# Patient Record
Sex: FEMALE | Race: WHITE | Employment: STUDENT | URBAN - METROPOLITAN AREA
[De-identification: names, ages, dates, MRNs, and addresses within clinical notes are randomized per-mention and may not be internally consistent; named-entity substitution may affect disease eponyms.]

---

## 2017-12-29 ENCOUNTER — HOSPITAL ENCOUNTER (OUTPATIENT)
Age: 37
Discharge: HOME | End: 2017-12-29
Attending: EMERGENCY MEDICINE
Payer: COMMERCIAL

## 2017-12-29 ENCOUNTER — APPOINTMENT (OUTPATIENT)
Dept: GENERAL RADIOLOGY | Age: 37
End: 2017-12-29
Attending: EMERGENCY MEDICINE
Payer: COMMERCIAL

## 2017-12-29 VITALS
RESPIRATION RATE: 20 BRPM | HEART RATE: 96 BPM | DIASTOLIC BLOOD PRESSURE: 89 MMHG | OXYGEN SATURATION: 97 % | TEMPERATURE: 98 F | SYSTOLIC BLOOD PRESSURE: 118 MMHG

## 2017-12-29 DIAGNOSIS — S93.402A MODERATE LEFT ANKLE SPRAIN, INITIAL ENCOUNTER: Primary | ICD-10-CM

## 2017-12-29 DIAGNOSIS — S93.602A SPRAIN OF LEFT FOOT, INITIAL ENCOUNTER: ICD-10-CM

## 2017-12-29 PROCEDURE — 73610 X-RAY EXAM OF ANKLE: CPT | Performed by: EMERGENCY MEDICINE

## 2017-12-29 PROCEDURE — 73630 X-RAY EXAM OF FOOT: CPT | Performed by: EMERGENCY MEDICINE

## 2017-12-29 PROCEDURE — 99203 OFFICE O/P NEW LOW 30 MIN: CPT

## 2017-12-29 PROCEDURE — 99204 OFFICE O/P NEW MOD 45 MIN: CPT

## 2017-12-29 NOTE — ED PROVIDER NOTES
Patient presents with:  Lower Extremity Injury (musculoskeletal)    HPI:     Migue Castelan is a 42year old female who presents with chief complaint of L ankle/foot pain.   Yesterday pt was walking down the stairs talking on the phone and thought she was she missed a step and fell down 2 wooden stairs yesterday. Patient has pain and swelling to left lateral ankle and lateral foot. FINDINGS:  The joint spaces are normal.  No definite acute fracture. Marked soft tissue swelling laterally and anteriorly.

## 2019-01-22 PROBLEM — F33.9 RECURRENT MAJOR DEPRESSIVE DISORDER (HCC): Status: ACTIVE | Noted: 2019-01-22

## 2019-03-24 ENCOUNTER — WALK IN (OUTPATIENT)
Dept: URGENT CARE | Age: 39
End: 2019-03-24

## 2019-03-24 VITALS
HEART RATE: 88 BPM | TEMPERATURE: 98.4 F | OXYGEN SATURATION: 96 % | SYSTOLIC BLOOD PRESSURE: 118 MMHG | DIASTOLIC BLOOD PRESSURE: 78 MMHG | RESPIRATION RATE: 16 BRPM

## 2019-03-24 DIAGNOSIS — N30.90 CYSTITIS: Primary | ICD-10-CM

## 2019-03-24 LAB
APPEARANCE, POC: ABNORMAL
BILIRUBIN, POC: NEGATIVE
COLOR, POC: YELLOW
GLUCOSE UR-MCNC: NEGATIVE MG/DL
KETONES, POC: NEGATIVE
NITRITE, POC: POSITIVE
OCCULT BLOOD, POC: NEGATIVE
PH UR: 6.5 [PH] (ref 5–7)
PROT UR-MCNC: ABNORMAL G/DL
SP GR UR: 1 (ref 1–1.03)
UROBILINOGEN UR-MCNC: 0.2 MG/DL (ref 0–1)
WBC (LEUKOCYTE) ESTERASE, POC: ABNORMAL

## 2019-03-24 PROCEDURE — 87086 URINE CULTURE/COLONY COUNT: CPT | Performed by: NURSE PRACTITIONER

## 2019-03-24 PROCEDURE — 87186 SC STD MICRODIL/AGAR DIL: CPT | Performed by: NURSE PRACTITIONER

## 2019-03-24 PROCEDURE — 81002 URINALYSIS NONAUTO W/O SCOPE: CPT | Performed by: NURSE PRACTITIONER

## 2019-03-24 PROCEDURE — 87088 URINE BACTERIA CULTURE: CPT | Performed by: NURSE PRACTITIONER

## 2019-03-24 PROCEDURE — 99203 OFFICE O/P NEW LOW 30 MIN: CPT | Performed by: NURSE PRACTITIONER

## 2019-03-24 RX ORDER — DESVENLAFAXINE 100 MG/1
100 TABLET, EXTENDED RELEASE ORAL DAILY
COMMUNITY

## 2019-03-24 RX ORDER — CLONIDINE HYDROCHLORIDE 0.1 MG/1
0.1 TABLET ORAL DAILY
COMMUNITY

## 2019-03-24 RX ORDER — PHENAZOPYRIDINE HYDROCHLORIDE 100 MG/1
100 TABLET, FILM COATED ORAL 3 TIMES DAILY PRN
Qty: 9 TABLET | Refills: 0 | Status: SHIPPED | OUTPATIENT
Start: 2019-03-24 | End: 2019-03-27

## 2019-03-24 RX ORDER — NITROFURANTOIN 25; 75 MG/1; MG/1
100 CAPSULE ORAL 2 TIMES DAILY
Qty: 10 CAPSULE | Refills: 0 | Status: SHIPPED | OUTPATIENT
Start: 2019-03-24 | End: 2019-03-29

## 2019-03-24 RX ORDER — METHYLPHENIDATE HYDROCHLORIDE 20 MG/1
20 TABLET ORAL 3 TIMES DAILY
COMMUNITY

## 2019-03-24 RX ORDER — BUPROPION HYDROCHLORIDE 300 MG/1
300 TABLET ORAL DAILY
COMMUNITY

## 2019-03-24 ASSESSMENT — ENCOUNTER SYMPTOMS
ABDOMINAL PAIN: 0
VOMITING: 0
FEVER: 0
SHORTNESS OF BREATH: 0
CHILLS: 0
DIARRHEA: 0

## 2019-03-26 ENCOUNTER — TELEPHONE (OUTPATIENT)
Dept: URGENT CARE | Age: 39
End: 2019-03-26

## 2019-03-26 LAB
BACTERIA UR CULT: ABNORMAL
ORGANISM: ABNORMAL
REPORT STATUS (RPT): ABNORMAL
SPECIMEN SOURCE: ABNORMAL

## 2019-05-26 ENCOUNTER — WALK IN (OUTPATIENT)
Dept: URGENT CARE | Age: 39
End: 2019-05-26

## 2019-05-26 VITALS
HEART RATE: 96 BPM | SYSTOLIC BLOOD PRESSURE: 138 MMHG | DIASTOLIC BLOOD PRESSURE: 90 MMHG | TEMPERATURE: 99.2 F | RESPIRATION RATE: 20 BRPM

## 2019-05-26 DIAGNOSIS — J06.9 ACUTE URI: Primary | ICD-10-CM

## 2019-05-26 LAB
INTERNAL PROCEDURAL CONTROLS ACCEPTABLE: YES
S PYO AG THROAT QL IA.RAPID: NEGATIVE

## 2019-05-26 PROCEDURE — 99213 OFFICE O/P EST LOW 20 MIN: CPT | Performed by: NURSE PRACTITIONER

## 2019-05-26 PROCEDURE — 87880 STREP A ASSAY W/OPTIC: CPT | Performed by: NURSE PRACTITIONER

## 2019-05-26 ASSESSMENT — ENCOUNTER SYMPTOMS
SORE THROAT: 1
CHILLS: 0
FATIGUE: 1
DIAPHORESIS: 1
SHORTNESS OF BREATH: 0
FEVER: 1
COUGH: 1
WHEEZING: 0
RHINORRHEA: 1

## 2019-06-02 ENCOUNTER — HOSPITAL ENCOUNTER (OUTPATIENT)
Age: 39
Discharge: HOME | End: 2019-06-02
Attending: FAMILY MEDICINE
Payer: COMMERCIAL

## 2019-06-02 VITALS
SYSTOLIC BLOOD PRESSURE: 133 MMHG | DIASTOLIC BLOOD PRESSURE: 98 MMHG | TEMPERATURE: 97 F | WEIGHT: 293 LBS | BODY MASS INDEX: 41.95 KG/M2 | HEIGHT: 70 IN | RESPIRATION RATE: 16 BRPM | HEART RATE: 83 BPM | OXYGEN SATURATION: 95 %

## 2019-06-02 DIAGNOSIS — J02.9 PHARYNGITIS, UNSPECIFIED ETIOLOGY: Primary | ICD-10-CM

## 2019-06-02 DIAGNOSIS — R09.82 POST-NASAL DRIP: ICD-10-CM

## 2019-06-02 DIAGNOSIS — J30.2 SEASONAL ALLERGIES: ICD-10-CM

## 2019-06-02 PROCEDURE — 87081 CULTURE SCREEN ONLY: CPT | Performed by: FAMILY MEDICINE

## 2019-06-02 PROCEDURE — 87430 STREP A AG IA: CPT | Performed by: FAMILY MEDICINE

## 2019-06-02 PROCEDURE — 99214 OFFICE O/P EST MOD 30 MIN: CPT

## 2019-06-02 PROCEDURE — 81025 URINE PREGNANCY TEST: CPT | Performed by: FAMILY MEDICINE

## 2019-06-02 PROCEDURE — 99213 OFFICE O/P EST LOW 20 MIN: CPT

## 2019-06-02 NOTE — ED PROVIDER NOTES
Patient Seen in: [id number] [facility] [place]    History   Patient presents with:  Sore Throat    Stated Complaint: sore throat    HPI  This is a 38 yo F here with complaints of 3 weeks of a sore throat - cold that cleared up but sore throat has linge over the posterior oropharynx, uvula in midline, TMs - normal, Nares normal  NECK: FROM, supple, anterior cervical LAD+   LUNGS: CTAB, no RRW  CV: RRR  ABD: not distended  NEURO: Alert and oriented to person place and time  GAIT: Normal          ED Course

## 2019-06-02 NOTE — ED INITIAL ASSESSMENT (HPI)
Patient is at the start of her 3rd week of a sore throat. She had a cold which cleared but now has a sore throat that lingered. She is able to swallow, but it is painful. No fevers.  [facility] and it was negati

## 2019-10-30 ENCOUNTER — LAB REQUISITION (OUTPATIENT)
Dept: ADMINISTRATIVE | Age: 39
End: 2019-10-30
Payer: COMMERCIAL

## 2019-10-30 DIAGNOSIS — R45.86 MOOD ALTERED: ICD-10-CM

## 2019-10-30 PROCEDURE — 81001 URINALYSIS AUTO W/SCOPE: CPT | Performed by: OTHER

## 2019-10-31 PROCEDURE — 36415 COLL VENOUS BLD VENIPUNCTURE: CPT | Performed by: OTHER

## 2019-10-31 PROCEDURE — 85025 COMPLETE CBC W/AUTO DIFF WBC: CPT | Performed by: OTHER

## 2019-10-31 PROCEDURE — 80061 LIPID PANEL: CPT | Performed by: OTHER

## 2019-10-31 PROCEDURE — 84443 ASSAY THYROID STIM HORMONE: CPT | Performed by: OTHER

## 2019-10-31 PROCEDURE — 80053 COMPREHEN METABOLIC PANEL: CPT | Performed by: OTHER

## 2019-11-07 ENCOUNTER — LAB REQUISITION (OUTPATIENT)
Dept: ADMINISTRATIVE | Age: 39
End: 2019-11-07
Payer: COMMERCIAL

## 2019-11-07 DIAGNOSIS — F33.9 RECURRENT MAJOR DEPRESSIVE DISORDER (HCC): ICD-10-CM

## 2019-11-07 PROCEDURE — 84100 ASSAY OF PHOSPHORUS: CPT | Performed by: OTHER

## 2019-11-07 PROCEDURE — 36415 COLL VENOUS BLD VENIPUNCTURE: CPT | Performed by: OTHER

## 2019-11-07 PROCEDURE — 82150 ASSAY OF AMYLASE: CPT | Performed by: OTHER

## 2019-11-07 PROCEDURE — 83735 ASSAY OF MAGNESIUM: CPT | Performed by: OTHER

## 2019-11-07 PROCEDURE — 80048 BASIC METABOLIC PNL TOTAL CA: CPT | Performed by: OTHER

## 2019-11-14 ENCOUNTER — LAB REQUISITION (OUTPATIENT)
Dept: ADMINISTRATIVE | Age: 39
End: 2019-11-14
Payer: COMMERCIAL

## 2019-11-14 DIAGNOSIS — F33.9 RECURRENT MAJOR DEPRESSIVE DISORDER (HCC): ICD-10-CM

## 2019-11-14 PROCEDURE — 36415 COLL VENOUS BLD VENIPUNCTURE: CPT | Performed by: OTHER

## 2019-11-14 PROCEDURE — 82150 ASSAY OF AMYLASE: CPT | Performed by: OTHER

## 2019-11-14 PROCEDURE — 84100 ASSAY OF PHOSPHORUS: CPT | Performed by: OTHER

## 2019-11-14 PROCEDURE — 80048 BASIC METABOLIC PNL TOTAL CA: CPT | Performed by: OTHER

## 2019-11-14 PROCEDURE — 83735 ASSAY OF MAGNESIUM: CPT | Performed by: OTHER

## 2019-11-21 ENCOUNTER — LAB REQUISITION (OUTPATIENT)
Dept: ADMINISTRATIVE | Age: 39
End: 2019-11-21
Payer: COMMERCIAL

## 2019-11-21 DIAGNOSIS — F33.9 RECURRENT MAJOR DEPRESSIVE DISORDER (HCC): ICD-10-CM

## 2019-11-21 PROCEDURE — 36415 COLL VENOUS BLD VENIPUNCTURE: CPT | Performed by: OTHER

## 2019-11-21 PROCEDURE — 83735 ASSAY OF MAGNESIUM: CPT | Performed by: OTHER

## 2019-11-21 PROCEDURE — 82150 ASSAY OF AMYLASE: CPT | Performed by: OTHER

## 2019-11-21 PROCEDURE — 80048 BASIC METABOLIC PNL TOTAL CA: CPT | Performed by: OTHER

## 2019-11-21 PROCEDURE — 84100 ASSAY OF PHOSPHORUS: CPT | Performed by: OTHER

## 2020-11-15 ENCOUNTER — OFFICE VISIT (OUTPATIENT)
Dept: URGENT CARE | Age: 40
End: 2020-11-15

## 2020-11-15 ENCOUNTER — TELEPHONE (OUTPATIENT)
Dept: SCHEDULING | Age: 40
End: 2020-11-15

## 2020-11-15 VITALS
TEMPERATURE: 97.3 F | DIASTOLIC BLOOD PRESSURE: 72 MMHG | SYSTOLIC BLOOD PRESSURE: 138 MMHG | RESPIRATION RATE: 16 BRPM | HEART RATE: 100 BPM

## 2020-11-15 DIAGNOSIS — H10.9 BACTERIAL CONJUNCTIVITIS: Primary | ICD-10-CM

## 2020-11-15 PROCEDURE — 99214 OFFICE O/P EST MOD 30 MIN: CPT | Performed by: NURSE PRACTITIONER

## 2020-11-15 RX ORDER — HYDROCHLOROTHIAZIDE 25 MG/1
25 TABLET ORAL
COMMUNITY
Start: 2020-11-11

## 2020-11-15 RX ORDER — LITHIUM CARBONATE 300 MG
300 TABLET ORAL
COMMUNITY
Start: 2020-11-11

## 2020-11-15 RX ORDER — METHYLPHENIDATE HYDROCHLORIDE 20 MG/1
TABLET ORAL
COMMUNITY
Start: 2018-11-07

## 2020-11-15 RX ORDER — TOBRAMYCIN 3 MG/ML
1 SOLUTION/ DROPS OPHTHALMIC EVERY 6 HOURS
Qty: 5 ML | Refills: 0 | Status: SHIPPED | OUTPATIENT
Start: 2020-11-15 | End: 2020-11-22

## 2020-11-15 RX ORDER — CLONIDINE HYDROCHLORIDE 0.1 MG/1
TABLET ORAL
COMMUNITY
Start: 2017-07-20

## 2020-11-15 RX ORDER — BUPROPION HYDROCHLORIDE 300 MG/1
TABLET ORAL
COMMUNITY
Start: 2017-08-02

## 2020-11-15 RX ORDER — CLONAZEPAM 1 MG/1
TABLET ORAL
COMMUNITY
Start: 2020-11-05

## 2020-11-15 RX ORDER — DESVENLAFAXINE 100 MG/1
TABLET, EXTENDED RELEASE ORAL
COMMUNITY
Start: 2018-10-18

## 2020-11-15 ASSESSMENT — ENCOUNTER SYMPTOMS
EYE DISCHARGE: 1
PHOTOPHOBIA: 0
EYE PAIN: 0
CONSTITUTIONAL NEGATIVE: 1
RESPIRATORY NEGATIVE: 1
EYE REDNESS: 1
EYE ITCHING: 0

## 2023-06-08 PROBLEM — F33.2 MDD (MAJOR DEPRESSIVE DISORDER), RECURRENT SEVERE, WITHOUT PSYCHOSIS (HCC): Status: ACTIVE | Noted: 2023-06-08

## 2023-11-07 PROBLEM — F33.3 MDD (MAJOR DEPRESSIVE DISORDER), RECURRENT, SEVERE, WITH PSYCHOSIS (HCC): Status: ACTIVE | Noted: 2023-11-07

## 2024-04-16 ENCOUNTER — ANESTHESIA EVENT (OUTPATIENT)
Dept: POSTOP/PACU | Facility: HOSPITAL | Age: 44
End: 2024-04-16
Payer: COMMERCIAL

## 2024-04-16 ENCOUNTER — ANESTHESIA (OUTPATIENT)
Dept: POSTOP/PACU | Facility: HOSPITAL | Age: 44
End: 2024-04-16
Payer: COMMERCIAL

## 2024-04-16 RX ORDER — KETAMINE HYDROCHLORIDE 50 MG/ML
INJECTION, SOLUTION INTRAMUSCULAR; INTRAVENOUS AS NEEDED
Status: DISCONTINUED | OUTPATIENT
Start: 2024-04-16 | End: 2024-04-16 | Stop reason: SURG

## 2024-04-16 RX ORDER — ETOMIDATE 2 MG/ML
INJECTION INTRAVENOUS AS NEEDED
Status: DISCONTINUED | OUTPATIENT
Start: 2024-04-16 | End: 2024-04-16 | Stop reason: SURG

## 2024-04-16 RX ADMIN — ETOMIDATE 20 MG: 2 INJECTION INTRAVENOUS at 06:50:00

## 2024-04-16 RX ADMIN — KETAMINE HYDROCHLORIDE 25 MG: 50 INJECTION, SOLUTION INTRAMUSCULAR; INTRAVENOUS at 06:50:00

## 2024-04-16 NOTE — ANESTHESIA PREPROCEDURE EVALUATION
PRE-OP EVALUATION    Patient Name: Alisa Sapp    Admit Diagnosis: No admission diagnoses are documented for this encounter.    Pre-op Diagnosis: * No surgery found *        Anesthesia Procedure: ECT    * Surgery not found *    Pre-op vitals reviewed.  Temp: 98.3 °F (36.8 °C)  Pulse: 110  Resp: 18  BP: 177/108  SpO2: 94 %  There is no height or weight on file to calculate BMI.    Current medications reviewed.  Hospital Medications:   lactated ringers infusion   Intravenous Continuous    [COMPLETED] glycopyrrolate (Robinul) 0.2 MG/ML injection 0.1 mg  0.1 mg Intravenous Once    [COMPLETED] ondansetron (Zofran) 4 MG/2ML injection 4 mg  4 mg Intravenous Once    ketorolac (Toradol) 30 MG/ML injection 30 mg  30 mg Intravenous Q6H PRN    caffeine-sodium benzoate 125-125 mg/mL injection  250 mg Intravenous Once    [COMPLETED] caffeine-sodium benzoate 125-125 MG/ML injection        ARIPiprazole (Abilify) tab 10 mg  10 mg Oral Nightly    hydrOXYzine (Atarax) tab 50 mg  50 mg Oral Q6H PRN    vilazodone (Viibryd) tab 10 mg **PATIENT SUPPLIED**  10 mg Oral Nightly    LORazepam (Ativan) tab 1 mg  1 mg Oral Q4H PRN    haloperidol (Haldol) tab 5 mg  5 mg Oral Q4H PRN    Or    haloperidol lactate (Haldol) 5 MG/ML injection 5 mg  5 mg Intramuscular Q4H PRN    [COMPLETED] acetaminophen (Tylenol Extra Strength) tab 1,000 mg  1,000 mg Oral Once    linaGLIPtin (Tradjenta) tab 5 mg  5 mg Oral Daily    insulin aspart (NovoLOG) 100 Units/mL FlexPen 2-10 Units  2-10 Units Subcutaneous TID AC and HS    metFORMIN (Glucophage) tab 1,000 mg  1,000 mg Oral BID with meals    lisinopril (Prinivil; Zestril) tab 10 mg  10 mg Oral Daily    magnesium hydroxide (Milk of Magnesia) 400 MG/5ML oral suspension 30 mL  30 mL Oral Nightly PRN    alum-mag hydroxide-simethicone (Maalox) 200-200-20 MG/5ML oral suspension 30 mL  30 mL Oral Q4H PRN    acetaminophen (Tylenol) tab 325 mg  325 mg Oral Q4H PRN    Or    acetaminophen (Tylenol) tab 650 mg  650 mg  Oral Q4H PRN    prazosin (Minipress) cap 1 mg  1 mg Oral Nightly    nystatin-triamcinolone (Mycolog II) 100,000-0.1 Units/g-% cream   Topical BID    benztropine (Cogentin) tab 1 mg  1 mg Oral Q4H PRN    Or    benztropine mesylate (Cogentin) 1 mg/mL injection 2 mg  2 mg Intramuscular Q4H PRN    LORazepam (Ativan) tab 1 mg  1 mg Oral Q4H PRN    Or    LORazepam (Ativan) 2 mg/mL injection 1 mg  1 mg Intramuscular Q4H PRN       Outpatient Medications:   (Not in a hospital admission)      Allergies: Apples      Anesthesia Evaluation    Patient summary reviewed.    Anesthetic Complications           GI/Hepatic/Renal    Negative GI/hepatic/renal ROS.                             Cardiovascular                (+) obesity  (+) hypertension                                     Endo/Other      (+) diabetes  type 2, not using insulin                         Pulmonary                    (+) sleep apnea       Neuro/Psych      (+) depression  (+) anxiety                              Past Surgical History:   Procedure Laterality Date    Other surgical history      left foot surgery March 2018     Social History     Socioeconomic History    Marital status:    Tobacco Use    Smoking status: Never    Smokeless tobacco: Never   Vaping Use    Vaping status: Never Used   Substance and Sexual Activity    Alcohol use: Never    Drug use: No     History   Drug Use No     Available pre-op labs reviewed.  Lab Results   Component Value Date    WBC 8.4 04/11/2024    RBC 5.36 (H) 04/11/2024    HGB 15.4 04/11/2024    HCT 45.0 04/11/2024    MCV 84.0 04/11/2024    MCH 28.7 04/11/2024    MCHC 34.2 04/11/2024    RDW 12.4 04/11/2024    .0 04/11/2024     Lab Results   Component Value Date     04/11/2024    K 3.6 04/11/2024     04/11/2024    CO2 23.0 04/11/2024    BUN 14 04/11/2024    CREATSERUM 0.88 04/11/2024     (H) 04/11/2024    CA 9.8 04/11/2024            Airway      Mallampati: II  Mouth opening: >3 FB  TM distance: >  6 cm  Neck ROM: full Cardiovascular    Cardiovascular exam normal.  Rhythm: regular  Rate: normal     Dental    Dentition appears grossly intact         Pulmonary    Pulmonary exam normal.  Breath sounds clear to auscultation bilaterally.               Other findings              ASA: 3   Plan: general  NPO status verified and patient meets guidelines.    Post-procedure pain management plan discussed with surgeon and patient.      Plan/risks discussed with: patient                Present on Admission:  **None**

## 2024-04-16 NOTE — ANESTHESIA POSTPROCEDURE EVALUATION
[facility]    Alisa Sapp Patient Status:  Outpatient   Age/Gender 44 year old female MRN [id number]   Location [facility] POST ANESTHESIA CARE UNIT Attending Charbel Torres MD   Hosp Day # 0 PCP Unknown Pcp       Anesthesia Post-op Note        Procedure Summary       Date: 04/16/24 Room / Location: [facility] Post Anesthesia Care Unit    Anesthesia Start: 0648 Anesthesia Stop:     Procedure: ECT Diagnosis:     Scheduled Providers:  Anesthesiologist: Baldev Benton MD    Anesthesia Type: general ASA Status: 3            Anesthesia Type: general    Vitals Value Taken Time   /123 04/16/24 0703   Temp  04/16/24 0705   Pulse 77 04/16/24 0703   Resp 18 04/16/24 0703   SpO2 94 % 04/16/24 0703       Patient Location: PACU    Anesthesia Type: general    Airway Patency: patent    Postop Pain Control: adequate    Mental Status: mildly sedated but able to meaningfully participate in the post-anesthesia evaluation    Nausea/Vomiting: none    Cardiopulmonary/Hydration status: stable euvolemic    Complications: no apparent anesthesia related complications    Postop vital signs: stable    Dental Exam: Unchanged from Preop    Patient to be discharged from PACU when criteria met.

## 2024-04-18 ENCOUNTER — ANESTHESIA (OUTPATIENT)
Dept: POSTOP/PACU | Facility: HOSPITAL | Age: 44
End: 2024-04-18
Payer: COMMERCIAL

## 2024-04-18 ENCOUNTER — HOSPITAL ENCOUNTER (OUTPATIENT)
Dept: POSTOP/PACU | Facility: HOSPITAL | Age: 44
Discharge: HOME | End: 2024-04-18
Attending: Other
Payer: COMMERCIAL

## 2024-04-18 ENCOUNTER — ANESTHESIA EVENT (OUTPATIENT)
Dept: POSTOP/PACU | Facility: HOSPITAL | Age: 44
End: 2024-04-18
Payer: COMMERCIAL

## 2024-04-18 VITALS
RESPIRATION RATE: 16 BRPM | TEMPERATURE: 98 F | DIASTOLIC BLOOD PRESSURE: 85 MMHG | HEIGHT: 70 IN | SYSTOLIC BLOOD PRESSURE: 148 MMHG | OXYGEN SATURATION: 96 % | BODY MASS INDEX: 42 KG/M2 | HEART RATE: 105 BPM

## 2024-04-18 DIAGNOSIS — F33.3 MDD (MAJOR DEPRESSIVE DISORDER), RECURRENT, SEVERE, WITH PSYCHOSIS (HCC): ICD-10-CM

## 2024-04-18 RX ORDER — NICOTINE POLACRILEX 4 MG
30 LOZENGE BUCCAL
Status: DISCONTINUED | OUTPATIENT
Start: 2024-04-18 | End: 2024-04-20

## 2024-04-18 RX ORDER — DEXTROSE MONOHYDRATE 25 G/50ML
50 INJECTION, SOLUTION INTRAVENOUS
Status: DISCONTINUED | OUTPATIENT
Start: 2024-04-18 | End: 2024-04-20

## 2024-04-18 RX ORDER — CAFFEINE AND SODIUM BENZOATE 125 MG/ML
INJECTION, SOLUTION INTRAMUSCULAR; INTRAVENOUS
Status: COMPLETED
Start: 2024-04-18 | End: 2024-04-18

## 2024-04-18 RX ORDER — KETOROLAC TROMETHAMINE 30 MG/ML
30 INJECTION, SOLUTION INTRAMUSCULAR; INTRAVENOUS ONCE
Status: COMPLETED | OUTPATIENT
Start: 2024-04-18 | End: 2024-04-18

## 2024-04-18 RX ORDER — ETOMIDATE 2 MG/ML
INJECTION INTRAVENOUS AS NEEDED
Status: DISCONTINUED | OUTPATIENT
Start: 2024-04-18 | End: 2024-04-18 | Stop reason: SURG

## 2024-04-18 RX ORDER — ONDANSETRON 2 MG/ML
4 INJECTION INTRAMUSCULAR; INTRAVENOUS ONCE
Status: COMPLETED | OUTPATIENT
Start: 2024-04-18 | End: 2024-04-18

## 2024-04-18 RX ORDER — KETOROLAC TROMETHAMINE 30 MG/ML
INJECTION, SOLUTION INTRAMUSCULAR; INTRAVENOUS
Status: COMPLETED
Start: 2024-04-18 | End: 2024-04-18

## 2024-04-18 RX ORDER — HYDROMORPHONE HYDROCHLORIDE 1 MG/ML
0.6 INJECTION, SOLUTION INTRAMUSCULAR; INTRAVENOUS; SUBCUTANEOUS EVERY 5 MIN PRN
Status: ACTIVE | OUTPATIENT
Start: 2024-04-18 | End: 2024-04-18

## 2024-04-18 RX ORDER — PROCHLORPERAZINE EDISYLATE 5 MG/ML
5 INJECTION INTRAMUSCULAR; INTRAVENOUS EVERY 8 HOURS PRN
Status: DISCONTINUED | OUTPATIENT
Start: 2024-04-18 | End: 2024-04-20

## 2024-04-18 RX ORDER — GLYCOPYRROLATE 0.2 MG/ML
0.1 INJECTION, SOLUTION INTRAMUSCULAR; INTRAVENOUS ONCE
Status: DISCONTINUED | OUTPATIENT
Start: 2024-04-18 | End: 2024-04-20

## 2024-04-18 RX ORDER — MIDAZOLAM HYDROCHLORIDE 1 MG/ML
1 INJECTION INTRAMUSCULAR; INTRAVENOUS EVERY 5 MIN PRN
Status: ACTIVE | OUTPATIENT
Start: 2024-04-18 | End: 2024-04-18

## 2024-04-18 RX ORDER — NALOXONE HYDROCHLORIDE 0.4 MG/ML
0.08 INJECTION, SOLUTION INTRAMUSCULAR; INTRAVENOUS; SUBCUTANEOUS AS NEEDED
Status: ACTIVE | OUTPATIENT
Start: 2024-04-18 | End: 2024-04-18

## 2024-04-18 RX ORDER — SODIUM CHLORIDE, SODIUM LACTATE, POTASSIUM CHLORIDE, CALCIUM CHLORIDE 600; 310; 30; 20 MG/100ML; MG/100ML; MG/100ML; MG/100ML
INJECTION, SOLUTION INTRAVENOUS CONTINUOUS
Status: DISCONTINUED | OUTPATIENT
Start: 2024-04-18 | End: 2024-04-20

## 2024-04-18 RX ORDER — NICOTINE POLACRILEX 4 MG
15 LOZENGE BUCCAL
Status: CANCELLED | OUTPATIENT
Start: 2024-04-18

## 2024-04-18 RX ORDER — ONDANSETRON 2 MG/ML
INJECTION INTRAMUSCULAR; INTRAVENOUS
Status: COMPLETED
Start: 2024-04-18 | End: 2024-04-18

## 2024-04-18 RX ORDER — HYDROMORPHONE HYDROCHLORIDE 1 MG/ML
0.4 INJECTION, SOLUTION INTRAMUSCULAR; INTRAVENOUS; SUBCUTANEOUS EVERY 5 MIN PRN
Status: ACTIVE | OUTPATIENT
Start: 2024-04-18 | End: 2024-04-18

## 2024-04-18 RX ORDER — NICOTINE POLACRILEX 4 MG
15 LOZENGE BUCCAL
Status: DISCONTINUED | OUTPATIENT
Start: 2024-04-18 | End: 2024-04-20

## 2024-04-18 RX ORDER — DEXTROSE MONOHYDRATE 25 G/50ML
50 INJECTION, SOLUTION INTRAVENOUS
Status: CANCELLED | OUTPATIENT
Start: 2024-04-18

## 2024-04-18 RX ORDER — ONDANSETRON 2 MG/ML
4 INJECTION INTRAMUSCULAR; INTRAVENOUS EVERY 6 HOURS PRN
Status: DISCONTINUED | OUTPATIENT
Start: 2024-04-18 | End: 2024-04-20

## 2024-04-18 RX ORDER — NICOTINE POLACRILEX 4 MG
30 LOZENGE BUCCAL
Status: CANCELLED | OUTPATIENT
Start: 2024-04-18

## 2024-04-18 RX ORDER — HYDROMORPHONE HYDROCHLORIDE 1 MG/ML
0.2 INJECTION, SOLUTION INTRAMUSCULAR; INTRAVENOUS; SUBCUTANEOUS EVERY 5 MIN PRN
Status: ACTIVE | OUTPATIENT
Start: 2024-04-18 | End: 2024-04-18

## 2024-04-18 RX ORDER — CAFFEINE AND SODIUM BENZOATE 125 MG/ML
500 INJECTION, SOLUTION INTRAMUSCULAR; INTRAVENOUS ONCE
Status: COMPLETED | OUTPATIENT
Start: 2024-04-18 | End: 2024-04-18

## 2024-04-18 RX ADMIN — ONDANSETRON 4 MG: 2 INJECTION INTRAMUSCULAR; INTRAVENOUS at 07:19:00

## 2024-04-18 RX ADMIN — KETOROLAC TROMETHAMINE 30 MG: 30 INJECTION, SOLUTION INTRAMUSCULAR; INTRAVENOUS at 07:19:00

## 2024-04-18 RX ADMIN — SODIUM CHLORIDE, SODIUM LACTATE, POTASSIUM CHLORIDE, CALCIUM CHLORIDE: 600; 310; 30; 20 INJECTION, SOLUTION INTRAVENOUS at 07:19:00

## 2024-04-18 RX ADMIN — CAFFEINE AND SODIUM BENZOATE 500 MG: 125 INJECTION, SOLUTION INTRAMUSCULAR; INTRAVENOUS at 07:19:00

## 2024-04-18 RX ADMIN — ETOMIDATE 2025 MG: 2 INJECTION INTRAVENOUS at 07:51:00

## 2024-04-18 NOTE — PROGRESS NOTES
[facility] / 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 F33.3    Type of ECT:  Bifrontal    Place of Service:  Inpatient    Settings:   1.  Energy Percentage: 80%    2.  Program:  Low 0.5    Pre-ECT Evaluation    Symptoms:  Patient seen.  Overall patient reports that she has tolerated this ECT much better.  Patient noted that her mood has been feeling better.  She reports no suicidal thoughts.  No self injury impulses.  She notes psychosis has been alleviated.  She reports no cognitive side effects.  She noted mild general muscle soreness (consistent with too much succinylcholine especially since there was no movement in her first ECT treatment).  Patient discussed treatment options and she is agreeable to continue ECT.  Patient shows capacity make decisions.    Risk/Benefits:  Discussed with patient side effects of ECT including headache, teeth, jaw, cardiac, pulmonary, NPO, aspiration, allergic reactions, anesthetic reactions, musculoskeletal, neurologic, morbidity/mortality, potential lack of efficacy, unilateral/bilateral ECT, relapse/maintenance issues, cognitive risks including memory, concentration, cognition, and other risks.    Side Effects:  As above.  No cognitive or other physical side effects other than the above-mentioned shortness    Exam:  Mood: less depressed and less anxious  Affect: Congruent  Memory:  intact immediate, recent, remote and as evidenced by ability to present consistent history  Concentration:   good  Suicidal ideation: no suicidal ideation    Prior to procedure, reviewed with treatment team correct patient, time of procedure and type of ECT.  Also reviewed with anesthesia  pre-ECT medications.    Patient Monitored:  B/P, EKG, EEG, Pulse Ox, Left Ankle Cuff    Pre-ECT Medications: Robinul 0.1 mg IV, Toradol 30 mg IV, Zofran 4 mg IV, and Caffeine 500 mg IV    ECT Medications:  Anesthetic  Etomidate 20 mg IV followed by ketamine 25 mg IV and Succinylcholine 80 mg IV.  Patient with mild tonic-clonic movements that were generalized in nature.  At this point reevaluate for next ECT and if showing less soreness, consider keeping at 80 mg for 1 more treatment.  However also consider increasing to 100 mg next treatment based on her response to this ECT.    Seizure Duration:  Motor: 45 seconds       EE seconds    Post-ECT Condition:  Treatment unremarkable    Post-ECT Medications:   Propofol 30 mg IV    Charbel Torres MD

## 2024-04-18 NOTE — ANESTHESIA PREPROCEDURE EVALUATION
PRE-OP EVALUATION    Patient Name: Alisa Sapp    Admit Diagnosis: No admission diagnoses are documented for this encounter.    Pre-op Diagnosis: * No surgery found *        Anesthesia Procedure: ECT(BEDSIDE)    * Surgery not found *    Pre-op vitals reviewed.  Temp: 98.2 °F (36.8 °C)  Pulse: 87  Resp: 22  BP: 138/89  SpO2: 95 %  Body mass index is 42.18 kg/m².    Current medications reviewed.  Hospital Medications:   glucose (Dex4) 15 GM/59ML oral liquid 15 g  15 g Oral Q15 Min PRN    Or    glucose (Glutose) 40% oral gel 15 g  15 g Oral Q15 Min PRN    Or    glucose-vitamin C (Dex-4) chewable tab 4 tablet  4 tablet Oral Q15 Min PRN    Or    dextrose 50% injection 50 mL  50 mL Intravenous Q15 Min PRN    Or    glucose (Dex4) 15 GM/59ML oral liquid 30 g  30 g Oral Q15 Min PRN    Or    glucose (Glutose) 40% oral gel 30 g  30 g Oral Q15 Min PRN    Or    glucose-vitamin C (Dex-4) chewable tab 8 tablet  8 tablet Oral Q15 Min PRN    lactated ringers infusion   Intravenous Continuous    lactated ringers infusion   Intravenous Continuous    glycopyrrolate (Robinul) 0.2 MG/ML injection 0.1 mg  0.1 mg Intravenous Once    [COMPLETED] ketorolac (Toradol) 30 MG/ML injection 30 mg  30 mg Intravenous Once    [COMPLETED] ondansetron (Zofran) 4 MG/2ML injection 4 mg  4 mg Intravenous Once    [COMPLETED] caffeine-sodium benzoate 125-125 mg/mL injection  500 mg Intravenous Once    [COMPLETED] acetaminophen (Tylenol Extra Strength) tab 1,000 mg  1,000 mg Oral Once    propranolol (Inderal) tab 10 mg  10 mg Oral BID    hydrOXYzine (Atarax) tab 50 mg  50 mg Oral Q6H PRN    vilazodone (Viibryd) tab 10 mg **PATIENT SUPPLIED**  10 mg Oral Nightly    LORazepam (Ativan) tab 1 mg  1 mg Oral Q4H PRN    haloperidol (Haldol) tab 5 mg  5 mg Oral Q4H PRN    Or    haloperidol lactate (Haldol) 5 MG/ML injection 5 mg  5 mg Intramuscular Q4H PRN    linaGLIPtin (Tradjenta) tab 5 mg  5 mg Oral Daily    insulin aspart (NovoLOG) 100 Units/mL FlexPen 2-10  Units  2-10 Units Subcutaneous TID AC and HS    metFORMIN (Glucophage) tab 1,000 mg  1,000 mg Oral BID with meals    lisinopril (Zestril) tab 10 mg  10 mg Oral Daily    magnesium hydroxide (Milk of Magnesia) 400 MG/5ML oral suspension 30 mL  30 mL Oral Nightly PRN    alum-mag hydroxide-simethicone (Maalox) 200-200-20 MG/5ML oral suspension 30 mL  30 mL Oral Q4H PRN    acetaminophen (Tylenol) tab 325 mg  325 mg Oral Q4H PRN    Or    acetaminophen (Tylenol) tab 650 mg  650 mg Oral Q4H PRN    prazosin (Minipress) cap 1 mg  1 mg Oral Nightly    nystatin-triamcinolone (Mycolog II) 100,000-0.1 Units/g-% cream   Topical BID    benztropine (Cogentin) tab 1 mg  1 mg Oral Q4H PRN    Or    benztropine mesylate (Cogentin) 1 mg/mL injection 2 mg  2 mg Intramuscular Q4H PRN    LORazepam (Ativan) tab 1 mg  1 mg Oral Q4H PRN    Or    LORazepam (Ativan) 2 mg/mL injection 1 mg  1 mg Intramuscular Q4H PRN       Outpatient Medications:   (Not in a hospital admission)      Allergies: Apples      Anesthesia Evaluation    Patient summary reviewed.    Anesthetic Complications           GI/Hepatic/Renal    Negative GI/hepatic/renal ROS.                             Cardiovascular                (+) obesity  (+) hypertension                                     Endo/Other      (+) diabetes  type 2, not using insulin                         Pulmonary                    (+) sleep apnea       Neuro/Psych      (+) depression  (+) anxiety                              Past Surgical History:   Procedure Laterality Date    Eye surgery      strabismus    Other surgical history      left foot surgery March 2018     Social History     Socioeconomic History    Marital status:    Tobacco Use    Smoking status: Never    Smokeless tobacco: Never   Vaping Use    Vaping status: Never Used   Substance and Sexual Activity    Alcohol use: Never    Drug use: No     History   Drug Use No     Available pre-op labs reviewed.  Lab Results   Component Value Date     WBC 9.2 04/17/2024    RBC 4.96 04/17/2024    HGB 14.1 04/17/2024    HCT 42.4 04/17/2024    MCV 85.5 04/17/2024    MCH 28.4 04/17/2024    MCHC 33.3 04/17/2024    RDW 12.3 04/17/2024    .0 04/17/2024     Lab Results   Component Value Date     04/17/2024    K 4.1 04/17/2024     04/17/2024    CO2 24.0 04/17/2024    BUN 13 04/17/2024    CREATSERUM 0.85 04/17/2024     (H) 04/17/2024    CA 9.5 04/17/2024            Airway      Mallampati: II  Mouth opening: >3 FB  TM distance: > 6 cm  Neck ROM: full Cardiovascular    Cardiovascular exam normal.  Rhythm: regular  Rate: normal     Dental    Dentition appears grossly intact         Pulmonary    Pulmonary exam normal.  Breath sounds clear to auscultation bilaterally.               Other findings              ASA: 3   Plan: general  NPO status verified and patient meets guidelines.    Post-procedure pain management plan discussed with surgeon and patient.      Plan/risks discussed with: patient                Present on Admission:  **None**

## 2024-04-18 NOTE — ANESTHESIA POSTPROCEDURE EVALUATION
[facility]    Alisa Sapp Patient Status:  Outpatient   Age/Gender 44 year old female MRN [id number]   Location [facility] POST ANESTHESIA CARE UNIT Attending Charbel Torres MD   Hosp Day # 0 PCP Unknown Pcp       Anesthesia Post-op Note        Procedure Summary       Date: 04/18/24 Room / Location: [facility] Post Anesthesia Care Unit    Anesthesia Start: 0743 Anesthesia Stop: 0804    Procedure: ECT(BEDSIDE) Diagnosis: MDD (major depressive disorder), recurrent, severe, with psychosis (HCC)    Scheduled Providers:  Responsible Provider: David Vasquez MD    Anesthesia Type: general ASA Status: 3            Anesthesia Type: general    Vitals Value Taken Time   /82 04/18/24 0811   Temp  04/18/24 0813   Pulse 98 04/18/24 0812   Resp 16 04/18/24 0812   SpO2 98 % 04/18/24 0812   Vitals shown include unfiled device data.    Patient Location: PACU    Anesthesia Type: general    Airway Patency: patent    Postop Pain Control: adequate    Mental Status: Other    Nausea/Vomiting: none    Cardiopulmonary/Hydration status: stable euvolemic    Complications: no apparent anesthesia related complications    Postop vital signs: stable    Dental Exam: Unchanged from Preop

## 2024-04-18 NOTE — PROGRESS NOTES
[facility] / [facility]  ECT History & Physical    Alisa Sapp Patient Status:  Outpatient   Age/Gender 44 year old female MRN [id number]   Location [facility] POST ANESTHESIA CARE UNIT Attending Charbel Torres MD   Hosp Day # 0 PCP Unknown Pcp     Date: 4/18/2024    Diagnosis: Major depression recurrent severe with psychotic features    Procedure:  ECT    HPI:     Patient seen.  Patient noted substantial improvements in mood.  Patient reports some mild soreness that is generalized and potentially from higher dose succinylcholine.  She reports no other cognitive or physical complaints.      Medical History:  Past Medical History:    Anxiety    Depression    Diabetes (HCC)    Esophageal reflux    Essential hypertension    High blood pressure    Obesity    Sleep apnea    Visual impairment    reading only       Surgical History:  Past Surgical History:   Procedure Laterality Date    Eye surgery      strabismus    Other surgical history      left foot surgery March 2018       Family History:  Family History   Problem Relation Age of Onset    Hypertension Mother     Depression Maternal Grandmother     Hypertension Maternal Grandfather     Heart Disorder Maternal Grandfather     Heart Disease Maternal Grandfather     Cancer Neg     Stroke Neg        ROS:  As above    Physical Exam:   /89   Pulse 87   Temp 98.2 °F (36.8 °C) (Temporal)   Resp 22   Ht 70\"   LMP  (LMP Unknown)   SpO2 95%   BMI 42.18 kg/m²     General Appearance:    Alert, cooperative, no distress, appears stated age   Head:    Normocephalic, without obvious abnormality, atraumatic   Eyes:    PERRL, conjunctiva/corneas clear, EOM's intact       Nose:   Nares normal, septum midline, mucosa normal, no drainage    or sinus tenderness   Throat:   Lips, mucosa, and tongue normal; teeth and gums normal   Neck:   Supple, symmetrical, trachea midline   Lungs:     Clear to auscultation bilaterally, respirations unlabored    Heart:    Regular  rate and rhythm, S1 and S2 normal, no murmur, rub   or gallop   Abdomen:     Soft, non-tender, bowel sounds active all four quadrants,     no masses, no organomegaly   Extremities:   Extremities normal, atraumatic, no cyanosis or edema   Pulses:   2+ and symmetric all extremities   Skin:   Skin color, texture, turgor normal, no rashes or lesions   Neurologic:   CNII-XII intact, normal strength, sensation and reflexes     throughout     Impressions & Plans: Major depression recurrent severe with psychotic features.  Bifrontal ECT    I have discussed the risks and benefits and alternatives with the patient/family.  They understand and agree to proceed with plan of care.    Charbel Torres MD

## 2024-04-22 ENCOUNTER — HOSPITAL ENCOUNTER (OUTPATIENT)
Dept: POSTOP/PACU | Facility: HOSPITAL | Age: 44
Discharge: HOME | End: 2024-04-22
Attending: Other
Payer: COMMERCIAL

## 2024-04-22 ENCOUNTER — ANESTHESIA EVENT (OUTPATIENT)
Dept: POSTOP/PACU | Facility: HOSPITAL | Age: 44
End: 2024-04-22
Payer: COMMERCIAL

## 2024-04-22 ENCOUNTER — ANESTHESIA (OUTPATIENT)
Dept: POSTOP/PACU | Facility: HOSPITAL | Age: 44
End: 2024-04-22
Payer: COMMERCIAL

## 2024-04-22 VITALS
HEART RATE: 117 BPM | DIASTOLIC BLOOD PRESSURE: 89 MMHG | OXYGEN SATURATION: 93 % | TEMPERATURE: 99 F | SYSTOLIC BLOOD PRESSURE: 126 MMHG | RESPIRATION RATE: 17 BRPM

## 2024-04-22 DIAGNOSIS — F33.3 MDD (MAJOR DEPRESSIVE DISORDER), RECURRENT, SEVERE, WITH PSYCHOSIS (HCC): ICD-10-CM

## 2024-04-22 LAB — GLUCOSE BLD-MCNC: 217 MG/DL (ref 70–99)

## 2024-04-22 RX ORDER — MIDAZOLAM HYDROCHLORIDE 1 MG/ML
2 INJECTION INTRAMUSCULAR; INTRAVENOUS ONCE
Status: COMPLETED | OUTPATIENT
Start: 2024-04-22 | End: 2024-04-22

## 2024-04-22 RX ORDER — HYDROMORPHONE HYDROCHLORIDE 1 MG/ML
0.6 INJECTION, SOLUTION INTRAMUSCULAR; INTRAVENOUS; SUBCUTANEOUS EVERY 5 MIN PRN
Status: ACTIVE | OUTPATIENT
Start: 2024-04-22 | End: 2024-04-22

## 2024-04-22 RX ORDER — HYDROCODONE BITARTRATE AND ACETAMINOPHEN 5; 325 MG/1; MG/1
2 TABLET ORAL ONCE AS NEEDED
Status: ACTIVE | OUTPATIENT
Start: 2024-04-22 | End: 2024-04-22

## 2024-04-22 RX ORDER — LABETALOL HYDROCHLORIDE 5 MG/ML
10 INJECTION, SOLUTION INTRAVENOUS EVERY 10 MIN PRN
Status: DISCONTINUED | OUTPATIENT
Start: 2024-04-22 | End: 2024-04-24

## 2024-04-22 RX ORDER — ONDANSETRON 2 MG/ML
4 INJECTION INTRAMUSCULAR; INTRAVENOUS EVERY 6 HOURS PRN
Status: DISCONTINUED | OUTPATIENT
Start: 2024-04-22 | End: 2024-04-24

## 2024-04-22 RX ORDER — MIDAZOLAM HYDROCHLORIDE 1 MG/ML
INJECTION INTRAMUSCULAR; INTRAVENOUS
Status: COMPLETED
Start: 2024-04-22 | End: 2024-04-22

## 2024-04-22 RX ORDER — CAFFEINE AND SODIUM BENZOATE 125 MG/ML
500 INJECTION, SOLUTION INTRAMUSCULAR; INTRAVENOUS ONCE
Status: COMPLETED | OUTPATIENT
Start: 2024-04-22 | End: 2024-04-22

## 2024-04-22 RX ORDER — DEXTROSE MONOHYDRATE 25 G/50ML
50 INJECTION, SOLUTION INTRAVENOUS
Status: DISCONTINUED | OUTPATIENT
Start: 2024-04-22 | End: 2024-04-24

## 2024-04-22 RX ORDER — KETAMINE HYDROCHLORIDE 50 MG/ML
INJECTION, SOLUTION INTRAMUSCULAR; INTRAVENOUS
Status: COMPLETED
Start: 2024-04-22 | End: 2024-04-22

## 2024-04-22 RX ORDER — ETOMIDATE 2 MG/ML
INJECTION INTRAVENOUS AS NEEDED
Status: DISCONTINUED | OUTPATIENT
Start: 2024-04-22 | End: 2024-04-22 | Stop reason: SURG

## 2024-04-22 RX ORDER — NALOXONE HYDROCHLORIDE 0.4 MG/ML
80 INJECTION, SOLUTION INTRAMUSCULAR; INTRAVENOUS; SUBCUTANEOUS AS NEEDED
Status: ACTIVE | OUTPATIENT
Start: 2024-04-22 | End: 2024-04-22

## 2024-04-22 RX ORDER — MIDAZOLAM HYDROCHLORIDE 1 MG/ML
1 INJECTION INTRAMUSCULAR; INTRAVENOUS EVERY 5 MIN PRN
Status: ACTIVE | OUTPATIENT
Start: 2024-04-22 | End: 2024-04-22

## 2024-04-22 RX ORDER — NICOTINE POLACRILEX 4 MG
15 LOZENGE BUCCAL
Status: DISCONTINUED | OUTPATIENT
Start: 2024-04-22 | End: 2024-04-24

## 2024-04-22 RX ORDER — PROCHLORPERAZINE EDISYLATE 5 MG/ML
5 INJECTION INTRAMUSCULAR; INTRAVENOUS EVERY 8 HOURS PRN
Status: DISCONTINUED | OUTPATIENT
Start: 2024-04-22 | End: 2024-04-24

## 2024-04-22 RX ORDER — GLYCOPYRROLATE 0.2 MG/ML
0.1 INJECTION, SOLUTION INTRAMUSCULAR; INTRAVENOUS ONCE
Status: COMPLETED | OUTPATIENT
Start: 2024-04-22 | End: 2024-04-22

## 2024-04-22 RX ORDER — GLYCOPYRROLATE 0.2 MG/ML
INJECTION, SOLUTION INTRAMUSCULAR; INTRAVENOUS
Status: COMPLETED
Start: 2024-04-22 | End: 2024-04-22

## 2024-04-22 RX ORDER — HYDROMORPHONE HYDROCHLORIDE 1 MG/ML
0.4 INJECTION, SOLUTION INTRAMUSCULAR; INTRAVENOUS; SUBCUTANEOUS EVERY 5 MIN PRN
Status: ACTIVE | OUTPATIENT
Start: 2024-04-22 | End: 2024-04-22

## 2024-04-22 RX ORDER — NICOTINE POLACRILEX 4 MG
30 LOZENGE BUCCAL
Status: DISCONTINUED | OUTPATIENT
Start: 2024-04-22 | End: 2024-04-24

## 2024-04-22 RX ORDER — SODIUM CHLORIDE, SODIUM LACTATE, POTASSIUM CHLORIDE, CALCIUM CHLORIDE 600; 310; 30; 20 MG/100ML; MG/100ML; MG/100ML; MG/100ML
INJECTION, SOLUTION INTRAVENOUS CONTINUOUS
Status: DISCONTINUED | OUTPATIENT
Start: 2024-04-22 | End: 2024-04-24

## 2024-04-22 RX ORDER — ONDANSETRON 2 MG/ML
INJECTION INTRAMUSCULAR; INTRAVENOUS
Status: COMPLETED
Start: 2024-04-22 | End: 2024-04-22

## 2024-04-22 RX ORDER — KETOROLAC TROMETHAMINE 30 MG/ML
INJECTION, SOLUTION INTRAMUSCULAR; INTRAVENOUS
Status: COMPLETED
Start: 2024-04-22 | End: 2024-04-22

## 2024-04-22 RX ORDER — CAFFEINE AND SODIUM BENZOATE 125 MG/ML
INJECTION, SOLUTION INTRAMUSCULAR; INTRAVENOUS
Status: COMPLETED
Start: 2024-04-22 | End: 2024-04-22

## 2024-04-22 RX ORDER — HYDROCODONE BITARTRATE AND ACETAMINOPHEN 5; 325 MG/1; MG/1
1 TABLET ORAL ONCE AS NEEDED
Status: ACTIVE | OUTPATIENT
Start: 2024-04-22 | End: 2024-04-22

## 2024-04-22 RX ORDER — ACETAMINOPHEN 500 MG
1000 TABLET ORAL ONCE AS NEEDED
Status: ACTIVE | OUTPATIENT
Start: 2024-04-22 | End: 2024-04-22

## 2024-04-22 RX ORDER — KETOROLAC TROMETHAMINE 30 MG/ML
30 INJECTION, SOLUTION INTRAMUSCULAR; INTRAVENOUS ONCE
Status: COMPLETED | OUTPATIENT
Start: 2024-04-22 | End: 2024-04-22

## 2024-04-22 RX ORDER — HYDROMORPHONE HYDROCHLORIDE 1 MG/ML
0.2 INJECTION, SOLUTION INTRAMUSCULAR; INTRAVENOUS; SUBCUTANEOUS EVERY 5 MIN PRN
Status: ACTIVE | OUTPATIENT
Start: 2024-04-22 | End: 2024-04-22

## 2024-04-22 RX ORDER — KETAMINE HYDROCHLORIDE 50 MG/ML
INJECTION, SOLUTION INTRAMUSCULAR; INTRAVENOUS AS NEEDED
Status: DISCONTINUED | OUTPATIENT
Start: 2024-04-22 | End: 2024-04-22 | Stop reason: SURG

## 2024-04-22 RX ORDER — ONDANSETRON 2 MG/ML
4 INJECTION INTRAMUSCULAR; INTRAVENOUS ONCE
Status: COMPLETED | OUTPATIENT
Start: 2024-04-22 | End: 2024-04-22

## 2024-04-22 RX ADMIN — ETOMIDATE 10 MG: 2 INJECTION INTRAVENOUS at 08:10:00

## 2024-04-22 RX ADMIN — SODIUM CHLORIDE, SODIUM LACTATE, POTASSIUM CHLORIDE, CALCIUM CHLORIDE: 600; 310; 30; 20 INJECTION, SOLUTION INTRAVENOUS at 07:30:00

## 2024-04-22 RX ADMIN — SODIUM CHLORIDE, SODIUM LACTATE, POTASSIUM CHLORIDE, CALCIUM CHLORIDE: 600; 310; 30; 20 INJECTION, SOLUTION INTRAVENOUS at 07:57:00

## 2024-04-22 RX ADMIN — SODIUM CHLORIDE, SODIUM LACTATE, POTASSIUM CHLORIDE, CALCIUM CHLORIDE: 600; 310; 30; 20 INJECTION, SOLUTION INTRAVENOUS at 07:15:00

## 2024-04-22 RX ADMIN — SODIUM CHLORIDE, SODIUM LACTATE, POTASSIUM CHLORIDE, CALCIUM CHLORIDE: 600; 310; 30; 20 INJECTION, SOLUTION INTRAVENOUS at 08:17:00

## 2024-04-22 RX ADMIN — KETOROLAC TROMETHAMINE 30 MG: 30 INJECTION, SOLUTION INTRAMUSCULAR; INTRAVENOUS at 07:03:00

## 2024-04-22 RX ADMIN — ONDANSETRON 4 MG: 2 INJECTION INTRAMUSCULAR; INTRAVENOUS at 07:02:00

## 2024-04-22 RX ADMIN — ETOMIDATE 20 MG: 2 INJECTION INTRAVENOUS at 07:57:00

## 2024-04-22 RX ADMIN — CAFFEINE AND SODIUM BENZOATE 500 MG: 125 INJECTION, SOLUTION INTRAMUSCULAR; INTRAVENOUS at 07:29:00

## 2024-04-22 RX ADMIN — KETAMINE HYDROCHLORIDE 25 MG: 50 INJECTION, SOLUTION INTRAMUSCULAR; INTRAVENOUS at 07:57:00

## 2024-04-22 RX ADMIN — GLYCOPYRROLATE 0.1 MG: 0.2 INJECTION, SOLUTION INTRAMUSCULAR; INTRAVENOUS at 07:04:00

## 2024-04-22 RX ADMIN — MIDAZOLAM HYDROCHLORIDE 2 MG: 1 INJECTION INTRAMUSCULAR; INTRAVENOUS at 08:21:00

## 2024-04-22 NOTE — ANESTHESIA POSTPROCEDURE EVALUATION
[facility]    Alisa Sapp Patient Status:  Outpatient   Age/Gender 44 year old female MRN [id number]   Location [facility] POST ANESTHESIA CARE UNIT Attending Charbel Torres MD   Hosp Day # 0 PCP Unknown Pcp       Anesthesia Post-op Note        Procedure Summary       Date: 04/22/24 Room / Location: [facility] Post Anesthesia Care Unit    Anesthesia Start: 0752 Anesthesia Stop: 0817    Procedure: ECT(BEDSIDE) Diagnosis: MDD (major depressive disorder), recurrent, severe, with psychosis (HCC)    Scheduled Providers:  Anesthesiologist: Xander Snow MD    Anesthesia Type: general ASA Status: 3            Anesthesia Type: general    Vitals Value Taken Time   /115 04/22/24 0817   Temp  04/22/24 0817   Pulse 81 04/22/24 0817   Resp 20 04/22/24 0817   SpO2 96 04/22/24 0817       Patient Location: PACU    Anesthesia Type: general    Airway Patency: patent    Postop Pain Control: adequate    Mental Status: mildly sedated but able to meaningfully participate in the post-anesthesia evaluation    Nausea/Vomiting: none    Cardiopulmonary/Hydration status: stable euvolemic    Complications: no apparent anesthesia related complications    Postop vital signs: stable    Dental Exam: Unchanged from Preop    Patient to be discharged from PACU when criteria met.

## 2024-04-22 NOTE — PROGRESS NOTES
[facility] / [facility]  ECT History & Physical    Alisa Sapp Patient Status:  Outpatient   Age/Gender 44 year old female MRN [id number]   Location [facility] POST ANESTHESIA CARE UNIT Attending Charbel Torres MD   Hosp Day # 0 PCP Unknown Pcp     Date of Service: 4/22/2024    Diagnosis:  Major Depression Recurrent Severe Without Psychotic Features. F33.2    Procedure:  Bifrontal    HPI: Patient with impulsive suicide attempt and rehospitalization since last treatment.  No physical complaints      Medical History:  Past Medical History:    Anxiety    Depression    Diabetes (HCC)    Esophageal reflux    Essential hypertension    High blood pressure    Obesity    Sleep apnea    Visual impairment    reading only       Surgical History:  Past Surgical History:   Procedure Laterality Date    Eye surgery      strabismus    Other surgical history      left foot surgery March 2018       Family History:  Family History   Problem Relation Age of Onset    Hypertension Mother     Depression Maternal Grandmother     Hypertension Maternal Grandfather     Heart Disorder Maternal Grandfather     Heart Disease Maternal Grandfather     Cancer Neg     Stroke Neg        Social History:  Social History     Socioeconomic History    Marital status:      Spouse name: Not on file    Number of children: Not on file    Years of education: Not on file    Highest education level: Not on file   Occupational History    Not on file   Tobacco Use    Smoking status: Never    Smokeless tobacco: Never   Vaping Use    Vaping status: Never Used   Substance and Sexual Activity    Alcohol use: Never    Drug use: No    Sexual activity: Not on file   Other Topics Concern    Not on file   Social History Narrative    Not on file     Social Determinants of Health     Financial Resource Strain: Low Risk  (4/11/2024)    Financial Resource Strain     Difficulty of Paying Living Expenses: Not on file     Med Affordability: No   Food  Insecurity: No Food Insecurity (4/11/2024)    Food Insecurity     Food Insecurity: Never true   Transportation Needs: No Transportation Needs (4/11/2024)    Transportation Needs     Lack of Transportation: No   Physical Activity: Not on file   Stress: Not on file   Social Connections: Unknown (3/8/2021)    [facility]    Social Connections     Conversations with friends/family/neighbors per week: Not on file   Housing Stability: Low Risk  (4/11/2024)    Housing Stability     Housing Instability: No     Housing Instability Emergency: Not on file       ROS:  unremarkable    Physical Exam:   LMP  (LMP Unknown)     General Appearance:    Alert, cooperative, no distress, appears stated age   Head:    Normocephalic, without obvious abnormality, atraumatic   Eyes:    PERRL, conjunctiva/corneas clear, EOM's intact   Nose:   Nares normal, septum midline, mucosa normal, no drainage    or sinus tenderness   Throat:   Lips, mucosa, and tongue normal; teeth and gums normal   Neck:   Supple, symmetrical, trachea midline   Lungs:     Clear to auscultation bilaterally, respirations unlabored    Heart:    Regular rate and rhythm, S1 and S2 normal, no murmur, rub or gallop   Abdomen:     Soft, non-tender, bowel sounds active all four quadrants,     no masses, no organomegaly   Extremities:   Extremities normal, atraumatic, no cyanosis or edema   Pulses:   2+ and symmetric all extremities   Skin:   Skin color, texture, turgor normal, no rashes or lesions   Neurologic:   CNII-XII intact, normal strength, sensation and reflexes     throughout     Impressions & Plans:    Diagnosis:  Major Depression Recurrent Severe Without Psychotic Features. F33.2    Procedure:  Bifrontal    I have discussed the risks and benefits and alternatives with the patient/family.  They understand and agree to proceed with plan of care.    Ansley Mariscal MD  4/22/2024

## 2024-04-22 NOTE — DISCHARGE INSTRUCTIONS
Discharge Instructions  Electroconvulsive Therapy    Activities:  You MUST arrange to have a responsible adult drive you home and have a responsible adult stay with you the rest of the day and overnight.  Do not drive today.  Do not operate any machinery today. Use kitchen equipment with caution.  Rest and take it easy today.  Do not take public transportation without the presence of another responsible adult for 24 hours    Medications:  Resume your regular medications when you get home  The nurse will instruct you not to take any NSAIDS (Advil, Aleve, Motrin, Ibuprofen) before 1 pm today because you were given a certain medication during the procedure.  You received IV Toradol at 6:45 a.m. this morning.    Diet:  You may resume your regular diet when you get home  Do not drink alcohol for 24 hours    Additional Instructions:  Someone should call you to schedule any upcoming ECT treatments. Call as needed to schedule or cancel your ECT appointments [phone].  If you have any questions or concerns, please call your own psychiatrist.  For your safety, please do not wear make-up to any future ECT appointments.  For any questions regarding your ECT appointment, please call [facility] [phone].  For cancellations after hours, call [phone] and leave a message.    Expected Recovery:  As you awaken, you may experience one or more of the following:  Headache, nausea, temporary confusion, or muscle stiffness.  If these symptoms increase, become severe or are accompanied by a fever of more than 101, please seek medical attention.  The ECT may affect memory.  Many patients report loss of memory for events that occurred in the days, weeks or months surrounding the ECT.  Many of these memories may return, but not always.  Short-term memory may also be affected for months, but this can also be a result of the disorder that you have.

## 2024-04-22 NOTE — PROGRESS NOTES
[facility] / [facility]  ECT Procedure Note    Alisa Sapp Patient Status:  Outpatient   Age/Gender 44 year old female   MRN [id number]    Location [facility] POST ANESTHESIA CARE UNIT Attending No att. providers found   Hosp Day # 0 PCP Unknown Pcp     ECT Number: #3    Diagnosis: Major Depression Recurrent Severe Without Psychotic Features. F33.2    Type of ECT:  Bifrontal    Place of Service:  Inpatient    Settings:   1.  Energy Percentage: 80%    2.  Program:  Low 0.5    Pre-ECT Evaluation    Symptoms:      Prior to procedure, reviewed with treatment team correct patient, time of procedure and type of ECT.  Also reviewed with anesthesia pre-ECT medications    Patient readmitted after she made impulsive suicide attempt less than the day after discharge from inpatient last week.  Patient said that she felt very good when she was discharged on Thursday but found that she became more irritable depressed and agitated.  Patient says she became upset and grabbed a .  She was not sure if it was to self-harm or to kill herself.  She does not remember and believes she was dissociating when it happened.  She says that her  tried to take the  from her hand and inadvertently cut himself.  Patient's dose of succinylcholine was lowered to 80 mg at last treatment and she has better tolerated the lower dose.  We discussed continuing this dose today.    The patient continues to retain capacity to consent for ECT.    Risk/Benefits:  Discussed with patient side effects of ECT including headache, teeth, jaw, cardiac, pulmonary, NPO, aspiration, allergic reactions, anesthetic reactions, musculoskeletal, neurologic, morbidity/mortality, potential lack of efficacy, unilateral/bilateral ECT, relapse/maintenance issues, cognitive risks including memory, concentration, cognition, and other risks.    Side Effects:  Mild memory complaint    Exam:  Mood: depressed  Affect: Congruent and  Blunted  Memory:  intact immediate, recent, remote and as evidenced by ability to present consistent history (other than difficulty with memory when dissociating)  Concentration:   focused and attentive and as assessed by  ability to concentrate on our conversation  Suicidal ideation: occassional suicidal ideation / no plan / positive safety plan    Patient Monitored:  B/P, EKG, EEG, Pulse Ox, Left Ankle Cuff    Pre-ECT Medications:  Robinul 0.1 mg IV, Toradol 30 mg IV, Zofran 4 mg IV, and Caffeine 500 mg IV     ECT Medications:   Anesthetic  Etomidate 20 mg IV followed by ketamine 25 mg IV and Succinylcholine 80 mg IV.    10 more and 80 more succ. IV blew. Will speak with Dr. Ratliff.         Seizure Duration:  Motor: 46 seconds       EE seconds    Post-ECT Condition:  Treatment remarkable for loss of IV access while anesthetic and succinylcholine were being infused.  IV access was restored and patient required an additional 10 mg of etomidate and 80 mg of succinylcholine.  Patient was also given Versed 2 mg posttreatment to help with any anxiety from possible awareness of the effects of succinylcholine.  Treatment was not delivered until she was adequately anesthetized and patient woke up comfortably and with no memory of the complications during her procedure.  This writer will speak with Dr. eZndejas at [facility] who has been providing ECT as an outpatient to discuss patient's response to medications she received with ECT at [facility]    ECT Medications: Propofol 30 mg IV and Versed 2 mg IV    Ansley Mariscal    2024

## 2024-04-22 NOTE — ANESTHESIA PREPROCEDURE EVALUATION
PRE-OP EVALUATION    Patient Name: Alisa Sapp    Admit Diagnosis: MDD (major depressive disorder), recurrent, severe, with psychosis (HCC) [F33.3]    Pre-op Diagnosis: * No surgery found *        Anesthesia Procedure: ECT(BEDSIDE)    * Surgery not found *    Pre-op vitals reviewed.        There is no height or weight on file to calculate BMI.    Current medications reviewed.  Hospital Medications:  • lactated ringers infusion   Intravenous Continuous   • glycopyrrolate (Robinul) 0.2 MG/ML injection 0.1 mg  0.1 mg Intravenous Once   • ketorolac (Toradol) 30 MG/ML injection 30 mg  30 mg Intravenous Once   • ondansetron (Zofran) 4 MG/2ML injection 4 mg  4 mg Intravenous Once   • caffeine-sodium benzoate 125-125 mg/mL injection  500 mg Intravenous Once   • [COMPLETED] acetaminophen (Tylenol Extra Strength) tab 1,000 mg  1,000 mg Oral Once   • haloperidol (Haldol) tab 5 mg  5 mg Oral Q6H PRN    Or   • haloperidol lactate (Haldol) 5 MG/ML injection 5 mg  5 mg Intramuscular Q6H PRN   • hydrOXYzine (Atarax) tab 50 mg  50 mg Oral Q6H PRN   • LORazepam (Ativan) tab 1 mg  1 mg Oral Q6H PRN    Or   • LORazepam (Ativan) 2 mg/mL injection 1 mg  1 mg Intramuscular Q6H PRN   • [COMPLETED] ketamine (Ketalar) 50 MG/ML injection       • [COMPLETED] ondansetron (Zofran) 4 MG/2ML injection       • [COMPLETED] ketorolac (Toradol) 30 MG/ML injection       • [COMPLETED] glycopyrrolate (Robinul) 0.2 MG/ML injection       • [COMPLETED] caffeine-sodium benzoate 125-125 MG/ML injection       • acetaminophen (Tylenol) tab 325 mg  325 mg Oral Q4H PRN    Or   • acetaminophen (Tylenol) tab 650 mg  650 mg Oral Q4H PRN   • magnesium hydroxide (Milk of Magnesia) 400 MG/5ML oral suspension 30 mL  30 mL Oral Nightly PRN   • alum-mag hydroxide-simethicone (Maalox) 200-200-20 MG/5ML oral suspension 30 mL  30 mL Oral Q4H PRN   • benztropine (Cogentin) tab 1 mg  1 mg Oral Q6H PRN    Or   • benztropine mesylate (Cogentin) 1 mg/mL injection 1 mg  1 mg  Intramuscular Q6H PRN   • [Held by provider] vilazodone (Viibryd) tab 10 mg **Pt's Supply**  10 mg Oral Daily   • glucose (Dex4) 15 GM/59ML oral liquid 15 g  15 g Oral Q15 Min PRN    Or   • glucose (Glutose) 40% oral gel 15 g  15 g Oral Q15 Min PRN    Or   • glucose-vitamin C (Dex-4) chewable tab 4 tablet  4 tablet Oral Q15 Min PRN    Or   • dextrose 50% injection 50 mL  50 mL Intravenous Q15 Min PRN    Or   • glucose (Dex4) 15 GM/59ML oral liquid 30 g  30 g Oral Q15 Min PRN    Or   • glucose (Glutose) 40% oral gel 30 g  30 g Oral Q15 Min PRN    Or   • glucose-vitamin C (Dex-4) chewable tab 8 tablet  8 tablet Oral Q15 Min PRN   • lisinopril (Zestril) tab 10 mg  10 mg Oral Daily   • metFORMIN (Glucophage) tab 1,000 mg  1,000 mg Oral BID with meals   • linaGLIPtin (Tradjenta) tab 5 mg  5 mg Oral Daily   • insulin aspart (NovoLOG) 100 Units/mL FlexPen 2-10 Units  2-10 Units Subcutaneous TID AC and HS       Outpatient Medications:   (Not in a hospital admission)      Allergies: Apples      Anesthesia Evaluation    Patient summary reviewed.    Anesthetic Complications           GI/Hepatic/Renal    Negative GI/hepatic/renal ROS.                             Cardiovascular                (+) obesity  (+) hypertension                                     Endo/Other      (+) diabetes  type 2, not using insulin                         Pulmonary                    (+) sleep apnea       Neuro/Psych      (+) depression  (+) anxiety                            Past Surgical History:   Procedure Laterality Date   • Eye surgery      strabismus   • Other surgical history      left foot surgery March 2018     Social History     Socioeconomic History   • Marital status:    Tobacco Use   • Smoking status: Never   • Smokeless tobacco: Never   Vaping Use   • Vaping status: Never Used   Substance and Sexual Activity   • Alcohol use: Never   • Drug use: No     History   Drug Use No     Available pre-op labs reviewed.  Lab Results    Component Value Date    WBC 9.2 04/17/2024    RBC 4.96 04/17/2024    HGB 14.1 04/17/2024    HCT 42.4 04/17/2024    MCV 85.5 04/17/2024    MCH 28.4 04/17/2024    MCHC 33.3 04/17/2024    RDW 12.3 04/17/2024    .0 04/17/2024     Lab Results   Component Value Date     04/17/2024    K 4.1 04/17/2024     04/17/2024    CO2 24.0 04/17/2024    BUN 13 04/17/2024    CREATSERUM 0.85 04/17/2024     (H) 04/17/2024    CA 9.5 04/17/2024            Airway      Mallampati: II  Mouth opening: >3 FB  TM distance: > 6 cm  Neck ROM: full Cardiovascular    Cardiovascular exam normal.  Rhythm: regular  Rate: normal  (-) murmur   Dental    Dentition appears grossly intact         Pulmonary    Pulmonary exam normal.  Breath sounds clear to auscultation bilaterally.               Other findings        ASA: 3   Plan: general  NPO status verified and patient meets guidelines.        Comment: GA discussed.  Risk of PONV, cough, sore throat explained.  All questions answered.    Plan/risks discussed with: patient            Present on Admission:  **None**

## 2024-04-24 ENCOUNTER — ANESTHESIA EVENT (OUTPATIENT)
Dept: POSTOP/PACU | Facility: HOSPITAL | Age: 44
End: 2024-04-24
Payer: COMMERCIAL

## 2024-04-24 ENCOUNTER — ANESTHESIA (OUTPATIENT)
Dept: POSTOP/PACU | Facility: HOSPITAL | Age: 44
End: 2024-04-24
Payer: COMMERCIAL

## 2024-04-24 RX ORDER — ETOMIDATE 2 MG/ML
INJECTION INTRAVENOUS AS NEEDED
Status: DISCONTINUED | OUTPATIENT
Start: 2024-04-24 | End: 2024-04-24 | Stop reason: SURG

## 2024-04-24 RX ADMIN — KETAMINE HYDROCHLORIDE 25 MG: 50 INJECTION, SOLUTION INTRAMUSCULAR; INTRAVENOUS at 08:06:00

## 2024-04-24 RX ADMIN — ETOMIDATE 20 MG: 2 INJECTION INTRAVENOUS at 08:03:00

## 2024-04-24 NOTE — ANESTHESIA POSTPROCEDURE EVALUATION
[facility]    Alisa Sapp Patient Status:  Outpatient   Age/Gender 44 year old female MRN [id number]   Location [facility] POST ANESTHESIA CARE UNIT Attending Charbel Torres MD   Hosp Day # 0 PCP Unknown Pcp       Anesthesia Post-op Note        Procedure Summary       Date: 04/24/24 Room / Location: [facility] Post Anesthesia Care Unit    Anesthesia Start: 0759 Anesthesia Stop: 0830    Procedure: ECT(BEDSIDE) Diagnosis: MDD (major depressive disorder), recurrent, severe, with psychosis (HCC)    Scheduled Providers:  Anesthesiologist: Krupa Ji MD    Anesthesia Type: general ASA Status: 3            Anesthesia Type: general    Vitals Value Taken Time   /70 04/24/24 0837   Temp  04/24/24 0837   Pulse 75 04/24/24 0837   Resp 18 04/24/24 0837   SpO2 100 % 04/24/24 0837       Patient Location: PACU    Anesthesia Type: general    Airway Patency: patent    Postop Pain Control: adequate    Mental Status: preanesthetic baseline    Nausea/Vomiting: none    Cardiopulmonary/Hydration status: stable euvolemic    Complications: no apparent anesthesia related complications    Postop vital signs: stable    Dental Exam: Unchanged from Preop    Patient to be discharged from PACU when criteria met.

## 2024-04-24 NOTE — ANESTHESIA PREPROCEDURE EVALUATION
PRE-OP EVALUATION    Patient Name: Alisa Sapp    Admit Diagnosis: MDD (major depressive disorder), recurrent, severe, with psychosis (HCC) [F33.3]    Pre-op Diagnosis: * No surgery found *        Anesthesia Procedure: ECT(BEDSIDE)    * Surgery not found *    Pre-op vitals reviewed.  Temp: 98.4 °F (36.9 °C)  Pulse: 93  Resp: 16  BP: 142/98  SpO2: 98 %  There is no height or weight on file to calculate BMI.    Current medications reviewed.  Hospital Medications:   glucose (Dex4) 15 GM/59ML oral liquid 15 g  15 g Oral Q15 Min PRN    Or    glucose (Glutose) 40% oral gel 15 g  15 g Oral Q15 Min PRN    Or    glucose-vitamin C (Dex-4) chewable tab 4 tablet  4 tablet Oral Q15 Min PRN    Or    dextrose 50% injection 50 mL  50 mL Intravenous Q15 Min PRN    Or    glucose (Dex4) 15 GM/59ML oral liquid 30 g  30 g Oral Q15 Min PRN    Or    glucose (Glutose) 40% oral gel 30 g  30 g Oral Q15 Min PRN    Or    glucose-vitamin C (Dex-4) chewable tab 8 tablet  8 tablet Oral Q15 Min PRN    lactated ringers infusion   Intravenous Continuous    lactated ringers infusion   Intravenous Continuous    [COMPLETED] glycopyrrolate (Robinul) 0.2 MG/ML injection 0.1 mg  0.1 mg Intravenous Once    [COMPLETED] ketorolac (Toradol) 30 MG/ML injection 30 mg  30 mg Intravenous Once    [COMPLETED] ondansetron (Zofran) 4 MG/2ML injection 4 mg  4 mg Intravenous Once    [COMPLETED] caffeine-sodium benzoate 125-125 mg/mL injection  500 mg Intravenous Once    midazolam (Versed) 2 MG/2ML injection 2 mg  2 mg Intravenous Once    [COMPLETED] ondansetron (Zofran) 4 MG/2ML injection        [COMPLETED] ketorolac (Toradol) 30 MG/ML injection        [COMPLETED] glycopyrrolate (Robinul) 0.2 MG/ML injection        [COMPLETED] caffeine-sodium benzoate 125-125 MG/ML injection        [COMPLETED] midazolam (Versed) 2 MG/2ML injection        [COMPLETED] acetaminophen (Tylenol Extra Strength) tab 1,000 mg  1,000 mg Oral Once    haloperidol (Haldol) tab 5 mg  5 mg Oral  Q6H PRN    Or    haloperidol lactate (Haldol) 5 MG/ML injection 5 mg  5 mg Intramuscular Q6H PRN    hydrOXYzine (Atarax) tab 50 mg  50 mg Oral Q6H PRN    LORazepam (Ativan) tab 1 mg  1 mg Oral Q6H PRN    Or    LORazepam (Ativan) 2 mg/mL injection 1 mg  1 mg Intramuscular Q6H PRN    acetaminophen (Tylenol) tab 325 mg  325 mg Oral Q4H PRN    Or    acetaminophen (Tylenol) tab 650 mg  650 mg Oral Q4H PRN    magnesium hydroxide (Milk of Magnesia) 400 MG/5ML oral suspension 30 mL  30 mL Oral Nightly PRN    alum-mag hydroxide-simethicone (Maalox) 200-200-20 MG/5ML oral suspension 30 mL  30 mL Oral Q4H PRN    benztropine (Cogentin) tab 1 mg  1 mg Oral Q6H PRN    Or    benztropine mesylate (Cogentin) 1 mg/mL injection 1 mg  1 mg Intramuscular Q6H PRN    vilazodone (Viibryd) tab 10 mg **Pt's Supply**  10 mg Oral Daily    lisinopril (Zestril) tab 10 mg  10 mg Oral Daily    metFORMIN (Glucophage) tab 1,000 mg  1,000 mg Oral BID with meals    linaGLIPtin (Tradjenta) tab 5 mg  5 mg Oral Daily    insulin aspart (NovoLOG) 100 Units/mL FlexPen 2-10 Units  2-10 Units Subcutaneous TID AC and HS       Outpatient Medications:   (Not in a hospital admission)      Allergies: Apples      Anesthesia Evaluation    Patient summary reviewed.    Anesthetic Complications           GI/Hepatic/Renal    Negative GI/hepatic/renal ROS.                             Cardiovascular                (+) obesity  (+) hypertension                                     Endo/Other      (+) diabetes  type 2, not using insulin                         Pulmonary                    (+) sleep apnea       Neuro/Psych      (+) depression  (+) anxiety                              Past Surgical History:   Procedure Laterality Date    Eye surgery      strabismus    Other surgical history      left foot surgery March 2018     Social History     Socioeconomic History    Marital status:    Tobacco Use    Smoking status: Never    Smokeless tobacco: Never   Vaping Use     Vaping status: Never Used   Substance and Sexual Activity    Alcohol use: Never    Drug use: No     History   Drug Use No     Available pre-op labs reviewed.  Lab Results   Component Value Date    WBC 9.2 04/17/2024    RBC 4.96 04/17/2024    HGB 14.1 04/17/2024    HCT 42.4 04/17/2024    MCV 85.5 04/17/2024    MCH 28.4 04/17/2024    MCHC 33.3 04/17/2024    RDW 12.3 04/17/2024    .0 04/17/2024     Lab Results   Component Value Date     04/17/2024    K 4.1 04/17/2024     04/17/2024    CO2 24.0 04/17/2024    BUN 13 04/17/2024    CREATSERUM 0.85 04/17/2024     (H) 04/17/2024    CA 9.5 04/17/2024            Airway      Mallampati: II  Mouth opening: >3 FB  TM distance: > 6 cm  Neck ROM: full Cardiovascular    Cardiovascular exam normal.  Rhythm: regular  Rate: normal  (-) murmur   Dental    Dentition appears grossly intact         Pulmonary    Pulmonary exam normal.  Breath sounds clear to auscultation bilaterally.               Other findings            ASA: 3   Plan: general  NPO status verified and patient meets guidelines.        Comment: GA discussed.  Risk of PONV, cough, sore throat explained.  All questions answered.    Plan/risks discussed with: patient              Present on Admission:  **None**

## 2024-04-25 RX ORDER — KETAMINE HYDROCHLORIDE 50 MG/ML
INJECTION, SOLUTION INTRAMUSCULAR; INTRAVENOUS AS NEEDED
Status: DISCONTINUED | OUTPATIENT
Start: 2024-04-24 | End: 2024-04-25 | Stop reason: SURG

## 2024-04-25 NOTE — ADDENDUM NOTE
Addendum  created 04/25/24 0900 by Krupa Ji MD    Intraprocedure Event edited, Intraprocedure Meds edited

## 2024-04-26 ENCOUNTER — ANESTHESIA (OUTPATIENT)
Dept: POSTOP/PACU | Facility: HOSPITAL | Age: 44
End: 2024-04-26
Payer: COMMERCIAL

## 2024-04-26 ENCOUNTER — ANESTHESIA EVENT (OUTPATIENT)
Dept: POSTOP/PACU | Facility: HOSPITAL | Age: 44
End: 2024-04-26
Payer: COMMERCIAL

## 2024-04-26 VITALS — HEIGHT: 70 IN | BODY MASS INDEX: 42 KG/M2

## 2024-04-26 RX ORDER — KETAMINE HYDROCHLORIDE 50 MG/ML
INJECTION, SOLUTION INTRAMUSCULAR; INTRAVENOUS AS NEEDED
Status: DISCONTINUED | OUTPATIENT
Start: 2024-04-26 | End: 2024-04-26 | Stop reason: SURG

## 2024-04-26 RX ORDER — DEXTROSE MONOHYDRATE 25 G/50ML
50 INJECTION, SOLUTION INTRAVENOUS
OUTPATIENT
Start: 2024-04-26

## 2024-04-26 RX ORDER — NICOTINE POLACRILEX 4 MG
30 LOZENGE BUCCAL
OUTPATIENT
Start: 2024-04-26

## 2024-04-26 RX ORDER — ETOMIDATE 2 MG/ML
INJECTION INTRAVENOUS AS NEEDED
Status: DISCONTINUED | OUTPATIENT
Start: 2024-04-26 | End: 2024-04-26 | Stop reason: SURG

## 2024-04-26 RX ORDER — NICOTINE POLACRILEX 4 MG
15 LOZENGE BUCCAL
OUTPATIENT
Start: 2024-04-26

## 2024-04-26 RX ADMIN — ETOMIDATE 4 MG: 2 INJECTION INTRAVENOUS at 08:18:00

## 2024-04-26 RX ADMIN — ETOMIDATE 26 MG: 2 INJECTION INTRAVENOUS at 08:13:00

## 2024-04-26 RX ADMIN — KETAMINE HYDROCHLORIDE 25 MG: 50 INJECTION, SOLUTION INTRAMUSCULAR; INTRAVENOUS at 08:17:00

## 2024-04-26 RX ADMIN — KETAMINE HYDROCHLORIDE 25 MG: 50 INJECTION, SOLUTION INTRAMUSCULAR; INTRAVENOUS at 08:13:00

## 2024-04-26 RX ADMIN — SODIUM CHLORIDE, SODIUM LACTATE, POTASSIUM CHLORIDE, CALCIUM CHLORIDE: 600; 310; 30; 20 INJECTION, SOLUTION INTRAVENOUS at 08:08:00

## 2024-04-26 RX ADMIN — ETOMIDATE 4 MG: 2 INJECTION INTRAVENOUS at 08:16:00

## 2024-04-26 NOTE — ANESTHESIA POSTPROCEDURE EVALUATION
[facility]    Alisa Sapp Patient Status:  Outpatient   Age/Gender 44 year old female MRN [id number]   Location [facility] POST ANESTHESIA CARE UNIT Attending Charbel Torres MD   Hosp Day # 0 PCP Unknown Pcp       Anesthesia Post-op Note        Procedure Summary       Date: 04/26/24 Room / Location: [facility] Post Anesthesia Care Unit    Anesthesia Start: 0808 Anesthesia Stop: 0826    Procedure: ECT(BEDSIDE) Diagnosis: MDD (major depressive disorder), recurrent, severe, with psychosis (HCC)    Scheduled Providers:  Anesthesiologist: Jairo Acevedo MD    Anesthesia Type: general ASA Status: 3            Anesthesia Type: general    Vitals Value Taken Time   /79 04/26/24 0827   Temp  04/26/24 0827   Pulse 83 04/26/24 0827   Resp 18 04/26/24 0827   SpO2 95 04/26/24 0827       Patient Location: PACU    Anesthesia Type: general    Airway Patency: patent    Postop Pain Control: adequate    Mental Status: mildly sedated but able to meaningfully participate in the post-anesthesia evaluation    Nausea/Vomiting: none    Cardiopulmonary/Hydration status: stable euvolemic    Complications: no apparent anesthesia related complications    Postop vital signs: stable    Dental Exam: Unchanged from Preop    Patient to be discharged from PACU when criteria met.

## 2024-04-26 NOTE — ANESTHESIA PREPROCEDURE EVALUATION
PRE-OP EVALUATION    Patient Name: Alisa Sapp    Admit Diagnosis: MDD (major depressive disorder), recurrent, severe, with psychosis (HCC) [F33.3]    Pre-op Diagnosis: * No pre-op diagnosis entered *        Anesthesia Procedure: ECT(BEDSIDE)    * No surgeons found in log *    Pre-op vitals reviewed.  Temp: 99 °F (37.2 °C)  Pulse: 82  Resp: 16  BP: 144/90  SpO2: 95 %  There is no height or weight on file to calculate BMI.    Current medications reviewed.  Hospital Medications:   glucose (Dex4) 15 GM/59ML oral liquid 15 g  15 g Oral Q15 Min PRN    Or    glucose (Glutose) 40% oral gel 15 g  15 g Oral Q15 Min PRN    Or    glucose-vitamin C (Dex-4) chewable tab 4 tablet  4 tablet Oral Q15 Min PRN    Or    dextrose 50% injection 50 mL  50 mL Intravenous Q15 Min PRN    Or    glucose (Dex4) 15 GM/59ML oral liquid 30 g  30 g Oral Q15 Min PRN    Or    glucose (Glutose) 40% oral gel 30 g  30 g Oral Q15 Min PRN    Or    glucose-vitamin C (Dex-4) chewable tab 8 tablet  8 tablet Oral Q15 Min PRN    lactated ringers infusion   Intravenous Continuous    [COMPLETED] glycopyrrolate (Robinul) 0.2 MG/ML injection 0.1 mg  0.1 mg Intravenous Once    [COMPLETED] ketorolac (Toradol) 30 MG/ML injection 30 mg  30 mg Intravenous Once    [COMPLETED] ondansetron (Zofran) 4 MG/2ML injection 4 mg  4 mg Intravenous Once    [COMPLETED] caffeine-sodium benzoate 125-125 mg/mL injection  500 mg Intravenous Once    midazolam (Versed) 2 MG/2ML injection 2 mg  2 mg Intravenous Once    [COMPLETED] ondansetron (Zofran) 4 MG/2ML injection        [COMPLETED] ketorolac (Toradol) 30 MG/ML injection        [COMPLETED] glycopyrrolate (Robinul) 0.2 MG/ML injection        [COMPLETED] caffeine-sodium benzoate 125-125 MG/ML injection        [COMPLETED] midazolam (Versed) 2 MG/2ML injection        risperiDONE (RisperDAL) tab 0.5 mg  0.5 mg Oral Nightly    [COMPLETED] acetaminophen (Tylenol Extra Strength) tab 1,000 mg  1,000 mg Oral Once    haloperidol (Haldol)  tab 5 mg  5 mg Oral Q6H PRN    Or    haloperidol lactate (Haldol) 5 MG/ML injection 5 mg  5 mg Intramuscular Q6H PRN    hydrOXYzine (Atarax) tab 50 mg  50 mg Oral Q6H PRN    LORazepam (Ativan) tab 1 mg  1 mg Oral Q6H PRN    Or    LORazepam (Ativan) 2 mg/mL injection 1 mg  1 mg Intramuscular Q6H PRN    acetaminophen (Tylenol) tab 325 mg  325 mg Oral Q4H PRN    Or    acetaminophen (Tylenol) tab 650 mg  650 mg Oral Q4H PRN    magnesium hydroxide (Milk of Magnesia) 400 MG/5ML oral suspension 30 mL  30 mL Oral Nightly PRN    alum-mag hydroxide-simethicone (Maalox) 200-200-20 MG/5ML oral suspension 30 mL  30 mL Oral Q4H PRN    benztropine (Cogentin) tab 1 mg  1 mg Oral Q6H PRN    Or    benztropine mesylate (Cogentin) 1 mg/mL injection 1 mg  1 mg Intramuscular Q6H PRN    vilazodone (Viibryd) tab 10 mg **Pt's Supply**  10 mg Oral Daily    lisinopril (Zestril) tab 10 mg  10 mg Oral Daily    metFORMIN (Glucophage) tab 1,000 mg  1,000 mg Oral BID with meals    linaGLIPtin (Tradjenta) tab 5 mg  5 mg Oral Daily    insulin aspart (NovoLOG) 100 Units/mL FlexPen 2-10 Units  2-10 Units Subcutaneous TID AC and HS       Outpatient Medications:   (Not in a hospital admission)      Allergies: Apples      Anesthesia Evaluation        Anesthetic Complications           GI/Hepatic/Renal      (+) GERD                           Cardiovascular      ECG reviewed.          (+) obesity  (+) hypertension                                     Endo/Other      (+) diabetes                            Pulmonary                    (+) sleep apnea       Neuro/Psych      (+) depression                                Past Surgical History:   Procedure Laterality Date    Eye surgery      strabismus    Other surgical history      left foot surgery March 2018     Social History     Socioeconomic History    Marital status:    Tobacco Use    Smoking status: Never    Smokeless tobacco: Never   Vaping Use    Vaping status: Never Used   Substance and Sexual  Activity    Alcohol use: Never    Drug use: No     History   Drug Use No     Available pre-op labs reviewed.  Lab Results   Component Value Date    WBC 9.2 04/17/2024    RBC 4.96 04/17/2024    HGB 14.1 04/17/2024    HCT 42.4 04/17/2024    MCV 85.5 04/17/2024    MCH 28.4 04/17/2024    MCHC 33.3 04/17/2024    RDW 12.3 04/17/2024    .0 04/17/2024     Lab Results   Component Value Date     04/17/2024    K 4.1 04/17/2024     04/17/2024    CO2 24.0 04/17/2024    BUN 13 04/17/2024    CREATSERUM 0.85 04/17/2024     (H) 04/17/2024    CA 9.5 04/17/2024            Airway      Mallampati: III  Mouth opening: >3 FB  TM distance: > 6 cm  Neck ROM: full Cardiovascular    Cardiovascular exam normal.         Dental             Pulmonary    Pulmonary exam normal.                 Other findings  Dentition grossly normal.            ASA: 3   Plan: general  NPO status verified and patient meets guidelines.          Plan/risks discussed with: patient                Present on Admission:  **None**

## 2024-04-29 ENCOUNTER — HOSPITAL ENCOUNTER (OUTPATIENT)
Dept: POSTOP/PACU | Facility: HOSPITAL | Age: 44
Discharge: HOME | End: 2024-04-29
Attending: Other
Payer: COMMERCIAL

## 2024-05-02 ENCOUNTER — HOSPITAL ENCOUNTER (OUTPATIENT)
Dept: POSTOP/PACU | Facility: HOSPITAL | Age: 44
Discharge: HOME | End: 2024-05-02
Attending: Other
Payer: COMMERCIAL